# Patient Record
Sex: FEMALE | Race: WHITE | Employment: PART TIME | RURAL
[De-identification: names, ages, dates, MRNs, and addresses within clinical notes are randomized per-mention and may not be internally consistent; named-entity substitution may affect disease eponyms.]

---

## 2022-01-21 ENCOUNTER — ANESTHESIA (OUTPATIENT)
Dept: SURGERY | Age: 27
End: 2022-01-21
Payer: MEDICAID

## 2022-01-21 ENCOUNTER — HOSPITAL ENCOUNTER (OUTPATIENT)
Age: 27
Setting detail: OUTPATIENT SURGERY
Discharge: HOME | End: 2022-01-21
Attending: OBSTETRICS & GYNECOLOGY | Admitting: OBSTETRICS & GYNECOLOGY
Payer: MEDICAID

## 2022-01-21 ENCOUNTER — ANESTHESIA EVENT (OUTPATIENT)
Dept: SURGERY | Age: 27
End: 2022-01-21
Payer: MEDICAID

## 2022-01-21 VITALS
SYSTOLIC BLOOD PRESSURE: 109 MMHG | TEMPERATURE: 97.8 F | WEIGHT: 115 LBS | OXYGEN SATURATION: 98 % | HEART RATE: 92 BPM | HEIGHT: 67 IN | DIASTOLIC BLOOD PRESSURE: 62 MMHG | BODY MASS INDEX: 18.05 KG/M2 | RESPIRATION RATE: 13 BRPM

## 2022-01-21 PROBLEM — O00.101 RIGHT TUBAL PREGNANCY WITHOUT INTRAUTERINE PREGNANCY: Status: ACTIVE | Noted: 2022-01-21

## 2022-01-21 LAB
ABO + RH BLD: NORMAL
BLOOD GROUP ANTIBODIES SERPL: NORMAL
ERYTHROCYTE [DISTWIDTH] IN BLOOD BY AUTOMATED COUNT: 12.7 % (ref 11.5–14.5)
HCT VFR BLD AUTO: 37.2 % (ref 35–47)
HGB BLD-MCNC: 12.9 G/DL (ref 11.5–16)
MCH RBC QN AUTO: 32.3 PG (ref 26–34)
MCHC RBC AUTO-ENTMCNC: 34.7 G/DL (ref 30–36.5)
MCV RBC AUTO: 93 FL (ref 80–99)
NRBC # BLD: 0 K/UL (ref 0–0.01)
NRBC BLD-RTO: 0 PER 100 WBC
PLATELET # BLD AUTO: 185 K/UL (ref 150–400)
PMV BLD AUTO: 11.9 FL (ref 8.9–12.9)
RBC # BLD AUTO: 4 M/UL (ref 3.8–5.2)
SPECIMEN EXP DATE BLD: NORMAL
WBC # BLD AUTO: 13.6 K/UL (ref 3.6–11)

## 2022-01-21 PROCEDURE — 36415 COLL VENOUS BLD VENIPUNCTURE: CPT

## 2022-01-21 PROCEDURE — 74011250636 HC RX REV CODE- 250/636: Performed by: OBSTETRICS & GYNECOLOGY

## 2022-01-21 PROCEDURE — 77030020829: Performed by: OBSTETRICS & GYNECOLOGY

## 2022-01-21 PROCEDURE — 77030008608 HC TRCR ENDOSC SMTH AMR -B: Performed by: OBSTETRICS & GYNECOLOGY

## 2022-01-21 PROCEDURE — 2709999900 HC NON-CHARGEABLE SUPPLY: Performed by: OBSTETRICS & GYNECOLOGY

## 2022-01-21 PROCEDURE — 77030005514 HC CATH URETH FOL14 BARD -A: Performed by: OBSTETRICS & GYNECOLOGY

## 2022-01-21 PROCEDURE — 77030011502 HC MANIP UTER ZUM ZINN -B: Performed by: OBSTETRICS & GYNECOLOGY

## 2022-01-21 PROCEDURE — 77030009852 HC PCH RTVR ENDOSC COVD -B: Performed by: OBSTETRICS & GYNECOLOGY

## 2022-01-21 PROCEDURE — 77030031139 HC SUT VCRL2 J&J -A: Performed by: OBSTETRICS & GYNECOLOGY

## 2022-01-21 PROCEDURE — 76010000153 HC OR TIME 1.5 TO 2 HR: Performed by: OBSTETRICS & GYNECOLOGY

## 2022-01-21 PROCEDURE — 85027 COMPLETE CBC AUTOMATED: CPT

## 2022-01-21 PROCEDURE — 86900 BLOOD TYPING SEROLOGIC ABO: CPT

## 2022-01-21 PROCEDURE — 77030010031 HC SCIS ENDOSC MPLR J&J -C: Performed by: OBSTETRICS & GYNECOLOGY

## 2022-01-21 PROCEDURE — 74011250636 HC RX REV CODE- 250/636

## 2022-01-21 PROCEDURE — 74011250636 HC RX REV CODE- 250/636: Performed by: ANESTHESIOLOGY

## 2022-01-21 PROCEDURE — 77030010507 HC ADH SKN DERMBND J&J -B: Performed by: OBSTETRICS & GYNECOLOGY

## 2022-01-21 PROCEDURE — 77030042352 HC GARMT COMPR -B: Performed by: OBSTETRICS & GYNECOLOGY

## 2022-01-21 PROCEDURE — 77030040922 HC BLNKT HYPOTHRM STRY -A: Performed by: OBSTETRICS & GYNECOLOGY

## 2022-01-21 PROCEDURE — 74011000250 HC RX REV CODE- 250: Performed by: ANESTHESIOLOGY

## 2022-01-21 PROCEDURE — 77030033639 HC SHR ENDO COAG HARM 36 J&J -E: Performed by: OBSTETRICS & GYNECOLOGY

## 2022-01-21 PROCEDURE — 74011000250 HC RX REV CODE- 250: Performed by: OBSTETRICS & GYNECOLOGY

## 2022-01-21 PROCEDURE — 77030035029 HC NDL INSUF VERES DISP COVD -B: Performed by: OBSTETRICS & GYNECOLOGY

## 2022-01-21 PROCEDURE — 76060000034 HC ANESTHESIA 1.5 TO 2 HR: Performed by: OBSTETRICS & GYNECOLOGY

## 2022-01-21 PROCEDURE — 77030012770 HC TRCR OPT FX AMR -B: Performed by: OBSTETRICS & GYNECOLOGY

## 2022-01-21 PROCEDURE — 76210000006 HC OR PH I REC 0.5 TO 1 HR: Performed by: OBSTETRICS & GYNECOLOGY

## 2022-01-21 PROCEDURE — 77030008522 HC TBNG INSUF LAPRO STRY -B: Performed by: OBSTETRICS & GYNECOLOGY

## 2022-01-21 PROCEDURE — 88305 TISSUE EXAM BY PATHOLOGIST: CPT

## 2022-01-21 PROCEDURE — 77030008574 HC TBNG SUC IRR STRY -B: Performed by: OBSTETRICS & GYNECOLOGY

## 2022-01-21 RX ORDER — SODIUM CHLORIDE 0.9 % (FLUSH) 0.9 %
5-40 SYRINGE (ML) INJECTION AS NEEDED
Status: DISCONTINUED | OUTPATIENT
Start: 2022-01-21 | End: 2022-01-21 | Stop reason: HOSPADM

## 2022-01-21 RX ORDER — ONDANSETRON 2 MG/ML
INJECTION INTRAMUSCULAR; INTRAVENOUS AS NEEDED
Status: DISCONTINUED | OUTPATIENT
Start: 2022-01-21 | End: 2022-01-21 | Stop reason: HOSPADM

## 2022-01-21 RX ORDER — ROCURONIUM BROMIDE 10 MG/ML
INJECTION, SOLUTION INTRAVENOUS AS NEEDED
Status: DISCONTINUED | OUTPATIENT
Start: 2022-01-21 | End: 2022-01-21 | Stop reason: HOSPADM

## 2022-01-21 RX ORDER — PROPOFOL 10 MG/ML
INJECTION, EMULSION INTRAVENOUS AS NEEDED
Status: DISCONTINUED | OUTPATIENT
Start: 2022-01-21 | End: 2022-01-21 | Stop reason: HOSPADM

## 2022-01-21 RX ORDER — SODIUM CHLORIDE, SODIUM LACTATE, POTASSIUM CHLORIDE, CALCIUM CHLORIDE 600; 310; 30; 20 MG/100ML; MG/100ML; MG/100ML; MG/100ML
100 INJECTION, SOLUTION INTRAVENOUS CONTINUOUS
Status: DISCONTINUED | OUTPATIENT
Start: 2022-01-21 | End: 2022-01-21 | Stop reason: HOSPADM

## 2022-01-21 RX ORDER — VASOPRESSIN 20 U/ML
1 INJECTION PARENTERAL ONCE
Status: DISCONTINUED | OUTPATIENT
Start: 2022-01-21 | End: 2022-01-21 | Stop reason: HOSPADM

## 2022-01-21 RX ORDER — DEXAMETHASONE SODIUM PHOSPHATE 4 MG/ML
INJECTION, SOLUTION INTRA-ARTICULAR; INTRALESIONAL; INTRAMUSCULAR; INTRAVENOUS; SOFT TISSUE AS NEEDED
Status: DISCONTINUED | OUTPATIENT
Start: 2022-01-21 | End: 2022-01-21 | Stop reason: HOSPADM

## 2022-01-21 RX ORDER — OXYCODONE AND ACETAMINOPHEN 5; 325 MG/1; MG/1
1 TABLET ORAL
Status: DISCONTINUED | OUTPATIENT
Start: 2022-01-21 | End: 2022-01-21 | Stop reason: HOSPADM

## 2022-01-21 RX ORDER — BUPIVACAINE HYDROCHLORIDE 2.5 MG/ML
10 INJECTION, SOLUTION EPIDURAL; INFILTRATION; INTRACAUDAL ONCE
Status: COMPLETED | OUTPATIENT
Start: 2022-01-21 | End: 2022-01-21

## 2022-01-21 RX ORDER — GLYCOPYRROLATE 0.2 MG/ML
INJECTION INTRAMUSCULAR; INTRAVENOUS AS NEEDED
Status: DISCONTINUED | OUTPATIENT
Start: 2022-01-21 | End: 2022-01-21 | Stop reason: HOSPADM

## 2022-01-21 RX ORDER — FENTANYL CITRATE 50 UG/ML
INJECTION, SOLUTION INTRAMUSCULAR; INTRAVENOUS AS NEEDED
Status: DISCONTINUED | OUTPATIENT
Start: 2022-01-21 | End: 2022-01-21 | Stop reason: HOSPADM

## 2022-01-21 RX ORDER — KETOROLAC TROMETHAMINE 30 MG/ML
INJECTION, SOLUTION INTRAMUSCULAR; INTRAVENOUS AS NEEDED
Status: DISCONTINUED | OUTPATIENT
Start: 2022-01-21 | End: 2022-01-21 | Stop reason: HOSPADM

## 2022-01-21 RX ORDER — LIDOCAINE HYDROCHLORIDE 20 MG/ML
INJECTION, SOLUTION EPIDURAL; INFILTRATION; INTRACAUDAL; PERINEURAL AS NEEDED
Status: DISCONTINUED | OUTPATIENT
Start: 2022-01-21 | End: 2022-01-21 | Stop reason: HOSPADM

## 2022-01-21 RX ORDER — KETAMINE HCL IN 0.9 % NACL 50 MG/5 ML
SYRINGE (ML) INTRAVENOUS AS NEEDED
Status: DISCONTINUED | OUTPATIENT
Start: 2022-01-21 | End: 2022-01-21 | Stop reason: HOSPADM

## 2022-01-21 RX ORDER — BUPIVACAINE HYDROCHLORIDE 2.5 MG/ML
INJECTION, SOLUTION EPIDURAL; INFILTRATION; INTRACAUDAL AS NEEDED
Status: DISCONTINUED | OUTPATIENT
Start: 2022-01-21 | End: 2022-01-21 | Stop reason: HOSPADM

## 2022-01-21 RX ORDER — SODIUM CHLORIDE 0.9 % (FLUSH) 0.9 %
5-40 SYRINGE (ML) INJECTION EVERY 8 HOURS
Status: DISCONTINUED | OUTPATIENT
Start: 2022-01-21 | End: 2022-01-21 | Stop reason: HOSPADM

## 2022-01-21 RX ORDER — MIDAZOLAM HYDROCHLORIDE 1 MG/ML
INJECTION, SOLUTION INTRAMUSCULAR; INTRAVENOUS AS NEEDED
Status: DISCONTINUED | OUTPATIENT
Start: 2022-01-21 | End: 2022-01-21 | Stop reason: HOSPADM

## 2022-01-21 RX ORDER — HYDROMORPHONE HYDROCHLORIDE 2 MG/ML
INJECTION, SOLUTION INTRAMUSCULAR; INTRAVENOUS; SUBCUTANEOUS AS NEEDED
Status: DISCONTINUED | OUTPATIENT
Start: 2022-01-21 | End: 2022-01-21 | Stop reason: HOSPADM

## 2022-01-21 RX ORDER — DIPHENHYDRAMINE HYDROCHLORIDE 50 MG/ML
12.5 INJECTION, SOLUTION INTRAMUSCULAR; INTRAVENOUS
Status: DISCONTINUED | OUTPATIENT
Start: 2022-01-21 | End: 2022-01-21 | Stop reason: HOSPADM

## 2022-01-21 RX ORDER — FENTANYL CITRATE 50 UG/ML
50 INJECTION, SOLUTION INTRAMUSCULAR; INTRAVENOUS
Status: DISCONTINUED | OUTPATIENT
Start: 2022-01-21 | End: 2022-01-21 | Stop reason: HOSPADM

## 2022-01-21 RX ORDER — NEOSTIGMINE METHYLSULFATE 1 MG/ML
INJECTION, SOLUTION INTRAVENOUS AS NEEDED
Status: DISCONTINUED | OUTPATIENT
Start: 2022-01-21 | End: 2022-01-21 | Stop reason: HOSPADM

## 2022-01-21 RX ORDER — HYDROMORPHONE HYDROCHLORIDE 2 MG/ML
0.5 INJECTION, SOLUTION INTRAMUSCULAR; INTRAVENOUS; SUBCUTANEOUS
Status: DISCONTINUED | OUTPATIENT
Start: 2022-01-21 | End: 2022-01-21 | Stop reason: HOSPADM

## 2022-01-21 RX ADMIN — SODIUM CHLORIDE, POTASSIUM CHLORIDE, SODIUM LACTATE AND CALCIUM CHLORIDE 100 ML/HR: 600; 310; 30; 20 INJECTION, SOLUTION INTRAVENOUS at 14:20

## 2022-01-21 RX ADMIN — PROPOFOL 50 MG: 10 INJECTION, EMULSION INTRAVENOUS at 14:38

## 2022-01-21 RX ADMIN — HYDROMORPHONE HYDROCHLORIDE 0.2 MG: 2 INJECTION, SOLUTION INTRAMUSCULAR; INTRAVENOUS; SUBCUTANEOUS at 15:30

## 2022-01-21 RX ADMIN — GLYCOPYRROLATE 0.2 MG: 0.2 INJECTION, SOLUTION INTRAMUSCULAR; INTRAVENOUS at 14:32

## 2022-01-21 RX ADMIN — PHENYLEPHRINE HYDROCHLORIDE 50 MCG: 10 INJECTION INTRAVENOUS at 14:51

## 2022-01-21 RX ADMIN — MIDAZOLAM 2 MG: 1 INJECTION INTRAMUSCULAR; INTRAVENOUS at 14:32

## 2022-01-21 RX ADMIN — LIDOCAINE HYDROCHLORIDE 75 MG: 20 INJECTION, SOLUTION EPIDURAL; INFILTRATION; INTRACAUDAL; PERINEURAL at 14:38

## 2022-01-21 RX ADMIN — HYDROMORPHONE HYDROCHLORIDE 0.2 MG: 2 INJECTION, SOLUTION INTRAMUSCULAR; INTRAVENOUS; SUBCUTANEOUS at 15:55

## 2022-01-21 RX ADMIN — Medication 10 MG: at 15:09

## 2022-01-21 RX ADMIN — GLYCOPYRROLATE 0.4 MG: 0.2 INJECTION, SOLUTION INTRAMUSCULAR; INTRAVENOUS at 15:54

## 2022-01-21 RX ADMIN — KETOROLAC TROMETHAMINE 30 MG: 30 INJECTION, SOLUTION INTRAMUSCULAR at 14:58

## 2022-01-21 RX ADMIN — FENTANYL CITRATE 50 MCG: 50 INJECTION, SOLUTION INTRAMUSCULAR; INTRAVENOUS at 14:38

## 2022-01-21 RX ADMIN — SODIUM CHLORIDE, POTASSIUM CHLORIDE, SODIUM LACTATE AND CALCIUM CHLORIDE: 600; 310; 30; 20 INJECTION, SOLUTION INTRAVENOUS at 15:19

## 2022-01-21 RX ADMIN — HYDROMORPHONE HYDROCHLORIDE 0.2 MG: 2 INJECTION, SOLUTION INTRAMUSCULAR; INTRAVENOUS; SUBCUTANEOUS at 15:17

## 2022-01-21 RX ADMIN — DEXAMETHASONE SODIUM PHOSPHATE 4 MG: 4 INJECTION, SOLUTION INTRAMUSCULAR; INTRAVENOUS at 14:58

## 2022-01-21 RX ADMIN — Medication 2 MG: at 15:54

## 2022-01-21 RX ADMIN — ONDANSETRON 4 MG: 2 INJECTION INTRAMUSCULAR; INTRAVENOUS at 14:58

## 2022-01-21 RX ADMIN — ROCURONIUM BROMIDE 35 MG: 50 INJECTION, SOLUTION INTRAVENOUS at 14:39

## 2022-01-21 RX ADMIN — Medication 20 MG: at 14:57

## 2022-01-21 NOTE — DISCHARGE INSTRUCTIONS
[address], [address], [address] [facility]    Outpatient Surgery Discharge Instructions      [facility] It is important that you take the medication exactly as they are prescribed.  Do not take other medications without consulting your doctor. Activity:   No lifting straining or exertional activities for 2 weeks.  You may take a shower tomorrow.  No driving for 5-7 days or while taking narcotics for pain.  Do not return to work until 7 days postop and not taking narcotics, if want/need to return to work sooner please call the [employer] office.  Nothing in vagina for 3 weeks      Recommended Diet: as tolerated    Wound Care:   · Remove bandage after 3 days. · May replace bandages if bloody. Follow-Up Care:   2 weeks with Dr. Damaris Perez as scheduled.   [phone]    Additional Information:  If you experience any of the following symptoms then please call me or return to the emergency room if you cannot contact your doctor:   Increased vaginal bleeding   Fever   Chills   Nausea   Vomiting   Diarrhea   Change in mentation   Falling   Bleeding   Shortness of breath

## 2022-01-21 NOTE — H&P
.Chief Complaint  Right ectopic tubal pregnancy    HPI  Rm 2   S/p pregnancy with IUD in place, at time when first presented iud removed after ultrasound failed to show IUP or ectopic, initial hcg levels ike in a normal fashion  c/o bright red bleeding, constipation, and nausea  no fever  pelvic pain that is diffuse in the lower abdomen, can be severe  Ultrasound in office today showed right ectopic pregnancy with heart beat present and measuring 5w 6d, free fluid in the posterior cul de sac      Patient's Care Team  OB/GYN: Liyah Browne MD (VIRTUAL VISITS AVAILABLE): [facility], [address] [place] [address], Ph [phone], Fax [phone] NPI: [id number]  Primary Care Provider: Avery Grajeda MD: [address] [place] [address], [address] [place] [address], [address] [place] [address], Ph [phone], Fax [phone] NPI: [id number]  Patient's Pharmacies  [address] [place] [address] [address] [facility]): [address] [place] [address], [address] [facility], Ph [phone], Fax [phone] [facility]): [address], Ph [phone], Fax [phone]    Vitals  Wt: 119 lbs (53.98 kg) 01/21/2022 11:55 am  BP: 130/70 01/21/2022 12:01 pm    Allergies  Allergies not reviewed (last reviewed 01/10/2022)  NKDA    Medications  Reviewed Medications  ondansetron 4 mg disintegrating tablet  DISSOLVE 1 TABLET IN MOUTH EVERY 8 HOURS AS NEEDED FOR NAUSEA / VOMITING  01/15/22   filled surescripts    Vaccines  Vaccines not reviewed (last reviewed 01/10/2022)  Vaccine Type Date Amt. Route Site [address] Lot # Mfr. Exp.   Date VIS VIS  Given Vaccinator  COVID-19  COVID-19, mRNA, LNP-S, PF, 100 mcg/0.5 mL dose (Moderna) 08/30/21     [id number]       COVID-19, mRNA, LNP-S, PF, 100 mcg/0.5 mL dose (Moderna) 08/02/21     [id number]       Diphtheria, Tetanus, Pertussis  Tdap 11/02/16     [id number]       Tdap 03/21/14            HPV  HPV, quadrivalent 07/22/15            Influenza  influenza, seasonal, injectable, preservative free 11/02/16     [id number]       influenza, seasonal, injectable 11/04/13            novel Influenza-H1N1-09, all formulations 10/28/09     [id number]         Problems  Reviewed Problems  Menorrhagia - Onset: 10/20/2018  Gynecologic examination - Onset: 10/20/2018    Family History  Discussed Family History  Mother - Diabetes mellitus    - Malignant tumor of cervix  Father - Hypercholesterolemia  Maternal Grandmother - Hypertensive disorder    Social History  Discussed Social History  Substance Use  Do you or have you ever smoked tobacco?: Never smoker  How much tobacco do you smoke?: None  What was the date of your most recent tobacco screening?: 01/10/2022  Education and Occupation  Are you currently employed?: Yes  Who is your employer?: [employer]  What is your occupation?:   Marriage and Sexuality  What is your relationship status?: Single  Are you sexually active?: Yes  How many children do you have?: 2  OB/GYN Social History  Are you working: Yes  On average, how many days per week do you engage in moderate to strenuous EXERCISE (like walking fast, running, jogging, dancing, swimming, biking, or other activities that cause a light or heavy sweat)?: 0  On those days, how many minutes, on average, do you engage in EXERCISE at this level?: 0  How often do you have a DRINK containing ALCOHOL?: Monthly or less  Other  Marital status: Single  Gender Identity and LGBTQ Identity    Surgical History  Reviewed Surgical History  Vaginal delivery of fetus - x2    GYN History  Reviewed GYN History  Date of LMP: 12/02/2021 (Notes: pregnant w/ Kaylyn Tierney). Flow: Heavy. Menses Monthly: Y. Menstrual Cramps: (Notes: mild every other month severe every other month). Premenstrual Syndrome: Y. Date of Last Pap Smear: 10/17/2018 (Notes: NIL). Abnormal Pap: N. Age at Menarche: 14. HPV Vaccine: Y (Notes: Gardasil 1 of 3 inj - teen years).   Sexually Active?: Y.  STIs/STDs: N.  Current Birth Control Method: None (Notes: Mirena placed 7/2015 by Lindsay Dumont --> removed & Paragard placed 3/7/17 @ [facility] --> Kyleena (MVogel) 3/21/19 --> 1/10/2022 (mvogel/pregnant w/ IUD in place)). Desired Birth Control Method: None. Obstetric History  Reviewed Obstetric History  TOTAL FULL PRE AB. I AB. S ECTOPICS MULTIPLE LIVING  3 2   1   2   x2, SAB x1    Past Pregnancies  Date # Fetuses GA Wks Labor Length Birth Weight Sex Delivery Type Outcome Anesthesia Delivery Place  Labor Notes Source  2014 1 40    F Vaginal delivery Full Term Birth    Ollie threatened PTL historical  2018 1 39  7 lbs. 14.99 oz. M Vaginal delivery Full Term Birth    [address] [place] [address] NN threatened PTL \"Camacho\" historical  2019 1         , Spontaneous     historical  Past Medical History  Discussed Past Medical History  No significant past medical history: Y    ROS  Additionally reports: Except as noted in the HPI, the review of systems is negative for General, Breast, , Resp, GI, CV, Endo, MS, Psych and Heme. Physical Exam  Patient is a 54-year-old female. Constitutional: General Appearance: well developed and nourished and pleasant. Level of Distress: no acute distress. Ambulation: ambulating normally. Head: Head: normocephalic. Eyes: Extraocular Movements extraocular movements intact. Ears, Nose, Mouth, Throat: Ears normal hearing. Nose: no external nose lesions. Neck: Appearance FROM and supple. Thyroid: non-tender and no enlargement. Lungs / Chest: Respiratory effort: unlabored. Inspection / Palpation: no deformity, tenderness, or swelling. Auscultation: no wheezing or rales/crackles. Percussion: no dullness or hyperresonance. Cardiovascular: Rate And Rhythm: regular. Heart Sounds: no murmurs. Extremities: no cyanosis or edema. Abdomen: Inspection and Palpation: RLQ & LLQ tenderness, guarding, masses, rebound tenderness, or CVA tenderness and soft and non-distended. Liver: non-tender; no masses. Spleen: no masses.  Hernia: none palpable. Female : External genitalia: no lesions, rash, or erythema. Vagina: no discharge, mass, or tenderness. Cervix: no discharge or cervical lesion, no CMT. Uterus: midline, non-tender, no mass, and not enlarged. Adnexae: no adnexal mass. Bilateral tenderness tenderness. Bladder and Urethra: no urethral discharge or mass. Lymphatics: Inguinal no inguinal lymphadenopathy. Skin: General Skin no rash or suspicious lesions. Neurologic: Gait and Station: normal gait. Sensation: grossly intact. Motor: grossly intact. Mental Status Exam: Orientation oriented to person, place, and time. Assessment / Plan  1. Ectopic pregnancy -  Rt. side  decision made at the time of the appointment to proceed with vaginal ultrasound    vaginal ultrasound revealed right ectopic pregnancy, heart beat present, fluid in the post cul de sac    discussed with fetal heart tones and possible rupture, methrotrexate contraindicated, surgical is the recommended route  Plan: Diagnostic and possible Operative Laparoscopy including possible salpingectomy, possible salpingotomy, possible oopherectomy      Discussed the procedure, reasons for the procedure and the risks and benefits of the procedure including infection, bleeding, transfusion risk (including infection, transfusion reaction or mismatch reaction), damage to bowel, bladder, nerves (due to incision or leg placement) and the rare risk of death with anesthesia or surgery. Questions answered, will proceed with the surgery.     O00.90: Unspecified ectopic pregnancy without intrauterine pregnancy      Return to Office  [address] [place] [facility] for Ultrasound Testing at [address] [place] [address] on 01/24/2022 at 02:00 PM  Fran Goldmann, MD for Return OB at [address] [place] [address] on 01/24/2022 at 02:00 PM

## 2022-01-21 NOTE — ANESTHESIA PREPROCEDURE EVALUATION
Relevant Problems   No relevant active problems       Anesthetic History        Pertinent negatives: No PONV       Review of Systems / Medical History  Patient summary reviewed, nursing notes reviewed and pertinent labs reviewed    Pulmonary              Pertinent negatives: No asthma and smoker     Neuro/Psych           Pertinent negatives: No seizures and CVA   Cardiovascular                Pertinent negatives: No hypertension, valvular problems/murmurs and dysrhythmias  Exercise tolerance: >4 METS     GI/Hepatic/Renal             Pertinent negatives: No GERD, liver disease and renal disease   Endo/Other          Pertinent negatives: No diabetes and obesity   Other Findings              Physical Exam    Airway  Mallampati: I  TM Distance: 4 - 6 cm  Neck ROM: normal range of motion   Mouth opening: Normal     Cardiovascular    Rhythm: regular  Rate: normal         Dental  No notable dental hx       Pulmonary  Breath sounds clear to auscultation               Abdominal  GI exam deferred       Other Findings            Anesthetic Plan    ASA: 1, emergent  Anesthesia type: general          Induction: Intravenous  Anesthetic plan and risks discussed with: Patient      Plan GETA. Pt understands and accepts associated risks and agrees with plan. All questions answered. Consent signed. Last PO intake was cup of coffee with cream at 0600. Will be NPO 8 hours at 1400.

## 2022-01-21 NOTE — ANESTHESIA POSTPROCEDURE EVALUATION
Post-Anesthesia Evaluation and Assessment    Cardiovascular Function/Vital Signs  Visit Vitals  BP (!) 111/57   Pulse 79   Temp 36.6 °C (97.8 °F)   Resp 15   Ht 5' 7\" (1.702 m)   Wt 52.2 kg (115 lb)   SpO2 100%   Breastfeeding No   BMI 18.01 kg/m²       Patient is status post Procedure(s):  LAPAROSCOPY GENERAL DIAGNOSTIC  SALPINGO-OOPHORECTOMY LAPAROSCOPIC POSSIBLE /SALPINGOTOMY. Nausea/Vomiting: Controlled. Postoperative hydration reviewed and adequate. Pain:  Pain Scale 1: Numeric (0 - 10) (01/21/22 1323)  Pain Intensity 1: 2 (01/21/22 1323)   Managed. Neurological Status:   Neuro (WDL): Within Defined Limits (01/21/22 1327)   At baseline. Mental Status and Level of Consciousness: Arousable. Pulmonary Status:   O2 Device: CO2 nasal cannula (01/21/22 1621)   Adequate oxygenation and airway patent. Complications related to anesthesia: None    Post-anesthesia assessment completed. No concerns. Patient has met all discharge requirements.     Signed By: Ervin Almonte MD    January 21, 2022

## 2022-01-21 NOTE — BRIEF OP NOTE
Brief Postoperative Note    Patient: Aga Ayala  YOB: 1995  MRN: [id number]    Date of Procedure: 1/21/2022     Pre-Op Diagnosis: Ovarian pregnancy without intrauterine pregnancy, unspecified laterality [O00.209]    Post-Op Diagnosis: right fallopian tube pregnancy      Procedure(s):  LAPAROSCOPY GENERAL DIAGNOSTIC  Laparoscopic right salpingectomy    Surgeon(s):  Redia Gosselin, MD    Surgical Assistant: None    Anesthesia: General/ET, incisional blocks using a total of 10mL of 0.25% marcaine without epinephrine    Estimated Blood Loss (mL): intraoperatively minimal blood loss, prior to surgery appears to be 50 ml of clotted and hemolyzed blood    Complications: None    Specimens:   ID Type Source Tests Collected by Time Destination   1 : Right Tubal Pregnancy Preservative Ovary  Redia Gosselin, MD 1/21/2022 1556 Pathology        Implants: * No implants in log *    Drains: * No LDAs found *    Findings: right tube rupture w/ clots from the rupture site distal to the the tubal pregnancy site, other wise normal appearing pelvis  Dictation confirmation number: [id number]    Electronically Signed by Rocio Altamirano MD on 1/21/2022 at 4:27 PM

## 2022-01-22 NOTE — OP NOTES
[facility]  OPERATIVE REPORT    Name:  Mar Aguillon  MR#:  [id number]  :  1995  ACCOUNT #:  [de-identified]  DATE OF SERVICE:  2022    PREOPERATIVE DIAGNOSIS:  Right tubal pregnancy. POSTOPERATIVE DIAGNOSIS:  Right tubal pregnancy. PROCEDURE PERFORMED:  Diagnostic laparoscopy and laparoscopic right salpingectomy. SURGEON:  Irina Gayle MD.    ASSISTANT: none    ANESTHESIA:  General/ET and incisional block using a total of 10 mL of 0.25% Marcaine without epinephrine. COMPLICATIONS:  None. SPECIMENS REMOVED:  Right tubal pregnancy. IMPLANTS:  None. ESTIMATED BLOOD LOSS:  Intraoperatively, minimal blood loss. Prior to surgery, there appeared to be about 15 mL of clotted and hemolyzed blood. CONSULTS:  None. DRAINS:  None. FINDINGS:  Right tube ruptured with clots from the ruptured site with a slight ooze distal to the tubal pregnancy site; otherwise, normal-appearing pelvis. SURGICAL TALK:  At our office, at her preop visit, we discussed the risks of surgery including infection, bleeding, damage to bowel, bladder, adjacent organs, secondary uterine perforation. We discussed rare risk of death. We discussed the risk of nerve injury secondary to leg placement. We discussed the alternatives. Questions answered and permit was signed. PROCEDURE:  The patient was taken to the operating room, placed on the operating table. General anesthesia was administered and an oropharyngeal airway placed. Carefully, she was put in dorsal lithotomy position utilizing the Yellofin leg holders where she was prepped with Betadine vaginally, vulvarly, and perineally, and then draped in normal fashion for this procedure. Time-out was performed. No concerns or questions were noted. Joyce catheter had been placed prior to draping. The posterior weighted speculum placed, the anterior retractor placed. Cervix was grasped with a single-tooth tenaculum.   The cervix was dilated to a 5 Hegar dilator that allowed the ZUMI uterine manipulator to be placed into the uterine cavity. The bulb was then blown up with approximately 5 mL of fluid. All retractors and tenaculums were removed. Area was double covered. Surgeon's gown and glove were removed. Then, attention was directed to the anterior abdominal wall. An umbilical incision was made after infiltrating with Marcaine and using a 15-blade, an incision was made and with the anterior wall elevated, a Veress needle was inserted into the peritoneal cavity, confirmed by easy flow of saline. Pneumoperitoneum created to 21 mL of mmHg pressure. The Veress needle was removed. The laparoscopic trocar and sheath were inserted just into the abdominal cavity and the pelvis was visualized. Pneumoperitoneum dropped to 13 mm Hg of pressure. She was put in Trendelenburg and the right tubal pregnancy was confirmed. A 5-mm port was placed at the level of her iliac crest, approximately 7 cm away from the midline, staying away from the vessels using transillumination. Then, through this, we cleared the clots and visualized the tube further, and the tube was not salvageable and decision was made to proceed onto salpingectomy. A 10-mm port was then placed on the right side at the level of the iliac crest staying away from the vessels using transillumination approximately 7 cm away from the midline. Through these ports, the remainder of the clots and the blood were removed. Using the Harmonic scalpel and atraumatic graspers, salpingectomy was performed  the tubes from the underlying mesosalpinx and transecting the tube at approximately 3 cm from the uterine origin. This was set aside. Further cleanup was performed. As many clots as possible were removed, and the site was visualized and good hemostasis noted. Pressure was stopped and still good pressure noted. A laparoscopic bag was placed into the pelvis.   The specimen was placed into the bag, and bag was brought back out through the 10-mm incision site. The 10-mm port was then replaced. Area was visualized one last time. Good hemostasis was noted. Under direct visualization, ports were removed. Pneumoperitoneum had dropped. Good hemostasis was noted. The video laparoscopic sheath and scope were removed as one unit under fair positive pressure given by Anesthesia. The 10-mm port fascial defect was closed with a single interrupted suture of 0 Vicryl. Skin was brought together with two 4-0 Vicryl. The umbilical site was then brought together with 4-0 Vicryl and then brought together more using dermabond at the other 5-mm port. At this point, anesthesia reversed. She was undraped and cleansed, ultimately extubated, taken out of lithotomy position. Ultimately, taken to the PACU in stable condition. Needle, sponge, and instrument counts were correct x2.       Kevin Barros MD      [id number]  D:  01/21/2022 16:38  T:  01/22/2022 3:37  JOB #:  [id number]  CC:  Edwin Cunningham MD

## 2022-03-19 PROBLEM — O00.101 RIGHT TUBAL PREGNANCY WITHOUT INTRAUTERINE PREGNANCY: Status: ACTIVE | Noted: 2022-01-21

## 2023-08-30 ENCOUNTER — OFFICE VISIT (OUTPATIENT)
Age: 28
End: 2023-08-30
Payer: MEDICAID

## 2023-08-30 VITALS
WEIGHT: 134.8 LBS | RESPIRATION RATE: 20 BRPM | OXYGEN SATURATION: 99 % | HEIGHT: 67 IN | BODY MASS INDEX: 21.16 KG/M2 | TEMPERATURE: 97.8 F | HEART RATE: 82 BPM | DIASTOLIC BLOOD PRESSURE: 75 MMHG | SYSTOLIC BLOOD PRESSURE: 108 MMHG

## 2023-08-30 DIAGNOSIS — F32.A ANXIETY AND DEPRESSION: Primary | ICD-10-CM

## 2023-08-30 DIAGNOSIS — F41.9 ANXIETY AND DEPRESSION: Primary | ICD-10-CM

## 2023-08-30 DIAGNOSIS — F90.0 ATTENTION DEFICIT HYPERACTIVITY DISORDER (ADHD), PREDOMINANTLY INATTENTIVE TYPE: ICD-10-CM

## 2023-08-30 DIAGNOSIS — F33.1 MODERATE EPISODE OF RECURRENT MAJOR DEPRESSIVE DISORDER (HCC): ICD-10-CM

## 2023-08-30 PROBLEM — O00.101 RIGHT TUBAL PREGNANCY WITHOUT INTRAUTERINE PREGNANCY: Status: RESOLVED | Noted: 2022-01-21 | Resolved: 2023-08-30

## 2023-08-30 PROBLEM — F90.9 ADHD (ATTENTION DEFICIT HYPERACTIVITY DISORDER): Status: ACTIVE | Noted: 2023-08-30

## 2023-08-30 PROCEDURE — 99203 OFFICE O/P NEW LOW 30 MIN: CPT | Performed by: FAMILY MEDICINE

## 2023-08-30 RX ORDER — BUPROPION HYDROCHLORIDE 100 MG/1
100 TABLET, EXTENDED RELEASE ORAL 2 TIMES DAILY
Qty: 60 TABLET | Refills: 3 | Status: SHIPPED | OUTPATIENT
Start: 2023-08-30

## 2023-08-30 SDOH — ECONOMIC STABILITY: FOOD INSECURITY: WITHIN THE PAST 12 MONTHS, YOU WORRIED THAT YOUR FOOD WOULD RUN OUT BEFORE YOU GOT MONEY TO BUY MORE.: NEVER TRUE

## 2023-08-30 SDOH — ECONOMIC STABILITY: HOUSING INSECURITY
IN THE LAST 12 MONTHS, WAS THERE A TIME WHEN YOU DID NOT HAVE A STEADY PLACE TO SLEEP OR SLEPT IN A SHELTER (INCLUDING NOW)?: NO

## 2023-08-30 SDOH — ECONOMIC STABILITY: FOOD INSECURITY: WITHIN THE PAST 12 MONTHS, THE FOOD YOU BOUGHT JUST DIDN'T LAST AND YOU DIDN'T HAVE MONEY TO GET MORE.: NEVER TRUE

## 2023-08-30 SDOH — ECONOMIC STABILITY: INCOME INSECURITY: HOW HARD IS IT FOR YOU TO PAY FOR THE VERY BASICS LIKE FOOD, HOUSING, MEDICAL CARE, AND HEATING?: NOT HARD AT ALL

## 2023-08-30 ASSESSMENT — PATIENT HEALTH QUESTIONNAIRE - PHQ9
3. TROUBLE FALLING OR STAYING ASLEEP: 3
5. POOR APPETITE OR OVEREATING: 1
9. THOUGHTS THAT YOU WOULD BE BETTER OFF DEAD, OR OF HURTING YOURSELF: 1
SUM OF ALL RESPONSES TO PHQ QUESTIONS 1-9: 17
2. FEELING DOWN, DEPRESSED OR HOPELESS: 2
SUM OF ALL RESPONSES TO PHQ QUESTIONS 1-9: 17
4. FEELING TIRED OR HAVING LITTLE ENERGY: 3
SUM OF ALL RESPONSES TO PHQ QUESTIONS 1-9: 16
7. TROUBLE CONCENTRATING ON THINGS, SUCH AS READING THE NEWSPAPER OR WATCHING TELEVISION: 0
10. IF YOU CHECKED OFF ANY PROBLEMS, HOW DIFFICULT HAVE THESE PROBLEMS MADE IT FOR YOU TO DO YOUR WORK, TAKE CARE OF THINGS AT HOME, OR GET ALONG WITH OTHER PEOPLE: 3
1. LITTLE INTEREST OR PLEASURE IN DOING THINGS: 2
SUM OF ALL RESPONSES TO PHQ9 QUESTIONS 1 & 2: 4
8. MOVING OR SPEAKING SO SLOWLY THAT OTHER PEOPLE COULD HAVE NOTICED. OR THE OPPOSITE, BEING SO FIGETY OR RESTLESS THAT YOU HAVE BEEN MOVING AROUND A LOT MORE THAN USUAL: 2
SUM OF ALL RESPONSES TO PHQ QUESTIONS 1-9: 17
6. FEELING BAD ABOUT YOURSELF - OR THAT YOU ARE A FAILURE OR HAVE LET YOURSELF OR YOUR FAMILY DOWN: 3

## 2023-08-30 ASSESSMENT — COLUMBIA-SUICIDE SEVERITY RATING SCALE - C-SSRS
1. WITHIN THE PAST MONTH, HAVE YOU WISHED YOU WERE DEAD OR WISHED YOU COULD GO TO SLEEP AND NOT WAKE UP?: YES
2. HAVE YOU ACTUALLY HAD ANY THOUGHTS OF KILLING YOURSELF?: NO
6. HAVE YOU EVER DONE ANYTHING, STARTED TO DO ANYTHING, OR PREPARED TO DO ANYTHING TO END YOUR LIFE?: NO

## 2023-09-27 ENCOUNTER — TELEMEDICINE (OUTPATIENT)
Age: 28
End: 2023-09-27
Payer: MEDICAID

## 2023-09-27 DIAGNOSIS — F41.9 ANXIETY AND DEPRESSION: ICD-10-CM

## 2023-09-27 DIAGNOSIS — F32.A ANXIETY AND DEPRESSION: ICD-10-CM

## 2023-09-27 DIAGNOSIS — F33.1 MODERATE EPISODE OF RECURRENT MAJOR DEPRESSIVE DISORDER (HCC): ICD-10-CM

## 2023-09-27 DIAGNOSIS — F90.0 ATTENTION DEFICIT HYPERACTIVITY DISORDER (ADHD), PREDOMINANTLY INATTENTIVE TYPE: Primary | ICD-10-CM

## 2023-09-27 PROCEDURE — 99442 PR PHYS/QHP TELEPHONE EVALUATION 11-20 MIN: CPT | Performed by: FAMILY MEDICINE

## 2023-09-27 RX ORDER — BUPROPION HYDROCHLORIDE 150 MG/1
150 TABLET, EXTENDED RELEASE ORAL 2 TIMES DAILY
Qty: 180 TABLET | Refills: 3 | Status: SHIPPED | OUTPATIENT
Start: 2023-09-27

## 2023-09-27 ASSESSMENT — PATIENT HEALTH QUESTIONNAIRE - PHQ9
SUM OF ALL RESPONSES TO PHQ9 QUESTIONS 1 & 2: 2
1. LITTLE INTEREST OR PLEASURE IN DOING THINGS: 1
SUM OF ALL RESPONSES TO PHQ QUESTIONS 1-9: 2
SUM OF ALL RESPONSES TO PHQ QUESTIONS 1-9: 2
2. FEELING DOWN, DEPRESSED OR HOPELESS: 1
SUM OF ALL RESPONSES TO PHQ QUESTIONS 1-9: 2
SUM OF ALL RESPONSES TO PHQ QUESTIONS 1-9: 2

## 2023-09-27 NOTE — PROGRESS NOTES
Alisha Barnett is a 33 y.o. female who was seen by synchronous (real-time) audio technology on 9/27/2023. Pt was seen at home in [place]. Provider was at the office in [place]. No other participants in this encounter. Subjective:   Medication Check (Wants to possibly up dosage. )    HPI:  Symptoms include Depression, anxiety, ADHD. Last visit we started wellbutrin SR 100mg, mark well, and is seeing some improvement in focus, but still feels scatterbrained a significant portion of the day. Depression sx have been gradually improving since last visit. Anxiety sx have been mild lately. Evaluation to date: seen in past by psychiatry, usu mgd by primary care. Treatment to date: Wellbutrin SR 100mg QD-BID, prev on concerta, lexapro, lorazepam. Worked so/so, didn't get consistent relief. PHQ-9 Total Score: 2 (9/27/2023  2:56 PM)    PMH, SH, Medications/Allergies: reviewed, on chart. Current Outpatient Medications   Medication Sig    buPROPion (WELLBUTRIN SR) 100 MG extended release tablet Take 1 tablet by mouth 2 times daily     No current facility-administered medications for this visit. No Known Allergies    ROS:  Constitutional: No fever, chills or abnormal weight loss  Respiratory: No cough, SOB   CV: No chest pain or Palpitations    VS review: Wt Readings from Last 3 Encounters:   08/30/23 134 lb 12.8 oz (61.1 kg)     BP Readings from Last 3 Encounters:   08/30/23 108/75       Objective:     General: alert, cooperative, and no distress   Mental  status: mental status: alert, oriented to person, place, and time, normal mood, behavior, speech, dress, motor activity, and thought processes   Resp:   Normal respiratory effort, no audible wheeze     Assessment & Plan:       A/P:  Depression and ADHD sx  Improving control with Wellbutrin SR 100mg, but not getting great control Mark well. Boost to 150mg SR BID. ADR/SE reviewed. F/U 2mo for recheck.       Time-based coding, delete if not needed: I spent

## (undated) DEVICE — SCISSORS ENDOSCP DIA5MM CRV MPLR CAUT W/ RATCH HNDL

## (undated) DEVICE — GLOVE ORANGE PI 7 1/2   MSG9075

## (undated) DEVICE — LEGGINGS, PAIR, 31X48, STERILE: Brand: MEDLINE

## (undated) DEVICE — ADHESIVE SKIN CLOSURE TOP 36 CC HI VISC DERMBND MINI

## (undated) DEVICE — BANDAGE ADH W0.75XL3IN UNIV WVN FAB NAT GEN USE STRP N ADH

## (undated) DEVICE — SUTURE COAT VCRL SZ 4-0 L18IN ABSRB UD L16MM PC-3 3/8 CIR J845G

## (undated) DEVICE — Device

## (undated) DEVICE — LABEL STERILIZATION W/PEN -- 50/CA

## (undated) DEVICE — SYR 10ML CTRL LR LCK NSAF LF --

## (undated) DEVICE — LAPAROSCOPY PACK: Brand: CONVERTORS

## (undated) DEVICE — DRAPE SHT 3 QTR PROXIMA 53X77 --

## (undated) DEVICE — GLOVE ORANGE PI 8   MSG9080

## (undated) DEVICE — SOL IRR SOD CL 0.9% 3000ML BG --

## (undated) DEVICE — LAPAROSCOPIC TROCAR SLEEVE/SINGLE USE: Brand: KII® OPTICAL ACCESS SYSTEM

## (undated) DEVICE — INTENDED FOR TISSUE SEPARATION, AND OTHER PROCEDURES THAT REQUIRE A SHARP SURGICAL BLADE TO PUNCTURE OR CUT.: Brand: BARD-PARKER SAFETY BLADES SIZE 11, STERILE

## (undated) DEVICE — SOLUTION IRRIG 1000ML 0.9% SOD CHL USP POUR PLAS BTL

## (undated) DEVICE — BLANKET WRM AD PREM MISTRAL-AIR

## (undated) DEVICE — 1230 DOUBLE MAGNET NEEDLE COUNTER,BLACK: Brand: DEVON

## (undated) DEVICE — Z DISCONTINUED BY MEDLINE USE 2733855 TRAY SKIN SCRB VAG PVP-I

## (undated) DEVICE — NON-ADHERENT DRESSING: Brand: TELFA

## (undated) DEVICE — SUTURE VCRL 0 L27IN ABSRB VLT CT L40MM 1/2 CIR TAPERPOINT J352H

## (undated) DEVICE — TROCAR: Brand: KII® OPTICAL ACCESS SYSTEM

## (undated) DEVICE — TRAY CATH FOLEY 16FR 5CC BALLN --

## (undated) DEVICE — PAD,SANITARY,11 IN,MAXI,N-STRL,IND WRAP: Brand: MEDLINE

## (undated) DEVICE — INSUFFLATION NEEDLE: Brand: SURGINEEDLE

## (undated) DEVICE — SPECIMEN RETRIEVAL POUCH: Brand: ENDO CATCH GOLD

## (undated) DEVICE — DISPOSABLE SUCTION/IRRIGATOR TUBE SET WITH TIP: Brand: AHTO

## (undated) DEVICE — APPLICATOR SCRB 26ML TEAL STRL -- CHLORAPREP 26ML

## (undated) DEVICE — INFECTION CONTROL KIT SYS

## (undated) DEVICE — X-RAY DETECTABLE SPONGES,16 PLY: Brand: VISTEC

## (undated) DEVICE — SHEAR HARMONIC ACET 5MMX36CM -- ACE PLUS

## (undated) DEVICE — TROCAR RMFG Z 3RD SLEEVE 5X100 -- LAWSON OEM ITEM 262365 PK/6

## (undated) DEVICE — Z DUPLICATE USE 2847003 INJECTOR UTER

## (undated) DEVICE — NEEDLE HYPO 25GA L1.5IN BLU POLYPR HUB S STL REG BVL STR

## (undated) DEVICE — SYR 10ML LUER LOK 1/5ML GRAD --

## (undated) DEVICE — NEEDLE HYPO 18GA L1.5IN PNK POLYPR HUB S STL THN WALL FILL

## (undated) DEVICE — SUTURE SZ 0 27IN 5/8 CIR UR-6  TAPER PT VIOLET ABSRB VICRYL J603H

## (undated) DEVICE — 3L THIN WALL CAN: Brand: CRD